# Patient Record
Sex: FEMALE | URBAN - METROPOLITAN AREA
[De-identification: names, ages, dates, MRNs, and addresses within clinical notes are randomized per-mention and may not be internally consistent; named-entity substitution may affect disease eponyms.]

---

## 2021-05-10 ENCOUNTER — TESTING ONLY (OUTPATIENT)
Dept: URBAN - METROPOLITAN AREA CLINIC 33 | Facility: CLINIC | Age: 26
End: 2021-05-10

## 2021-05-10 DIAGNOSIS — H52.223 REGULAR ASTIGMATISM, BILATERAL: Primary | ICD-10-CM

## 2021-05-10 ASSESSMENT — VISUAL ACUITY
OD: 20/25-2
OS: 20/20

## 2021-05-10 ASSESSMENT — INTRAOCULAR PRESSURE
OD: 13
OS: 13

## 2021-05-21 ENCOUNTER — SURGERY (OUTPATIENT)
Dept: URBAN - METROPOLITAN AREA SURGERY 16 | Facility: SURGERY | Age: 26
End: 2021-05-21

## 2021-05-21 ENCOUNTER — POST-OPERATIVE VISIT (OUTPATIENT)
Dept: URBAN - METROPOLITAN AREA CLINIC 33 | Facility: CLINIC | Age: 26
End: 2021-05-21

## 2021-05-21 PROCEDURE — LASIK LASIK SURGEON'S FEE: CUSTOM | Performed by: OPHTHALMOLOGY

## 2021-05-21 PROCEDURE — 99024 POSTOP FOLLOW-UP VISIT: CPT | Performed by: OPHTHALMOLOGY

## 2021-05-21 PROCEDURE — LASIK LASIK PRKSURGEON'S FEE: CUSTOM | Performed by: OPHTHALMOLOGY

## 2021-05-21 NOTE — IMPRESSION/PLAN
Impression:  Encounter for surgical aftercare following surgery on a sense organ  Z48.810.  Excellent post op course   Post operative instructions reviewed - Condition is improving - Plan: Ofloxacin and Durezol QID OU, PF ATS Q30 minutes

## 2021-06-01 ENCOUNTER — POST-OPERATIVE VISIT (OUTPATIENT)
Dept: URBAN - METROPOLITAN AREA CLINIC 11 | Facility: CLINIC | Age: 26
End: 2021-06-01

## 2021-06-01 DIAGNOSIS — Z48.810 ENCOUNTER FOR SURGICAL AFTERCARE FOLLOWING SURGERY ON A SENSE ORGAN: Primary | ICD-10-CM

## 2021-06-01 PROCEDURE — 99024 POSTOP FOLLOW-UP VISIT: CPT | Performed by: OPTOMETRIST

## 2021-06-01 NOTE — IMPRESSION/PLAN
Impression: S/P LASIK OU - 11 Days. Encounter for surgical aftercare following surgery on a sense organ  Z48.810. Plan: drops f/u [facility] in 1wk d/c oflox Durezol 1gt 6x/day OD, bid OS
art tears 1gt q 30 min - 1hr

## 2021-06-07 ENCOUNTER — POST-OPERATIVE VISIT (OUTPATIENT)
Dept: URBAN - METROPOLITAN AREA CLINIC 32 | Facility: CLINIC | Age: 26
End: 2021-06-07

## 2021-06-07 PROCEDURE — 99024 POSTOP FOLLOW-UP VISIT: CPT | Performed by: OPHTHALMOLOGY

## 2021-06-07 ASSESSMENT — INTRAOCULAR PRESSURE
OS: 20
OD: 20

## 2021-06-07 NOTE — IMPRESSION/PLAN
Impression: S/P LASIK OU - 17 Days. Encounter for surgical aftercare following surgery on a sense organ  Z48.810. Post operative instructions reviewed - Condition is improving - Plan: Decrease durezol to TID OD and D/C OS.  Continue ATS OU

## 2021-08-02 ENCOUNTER — POST-OPERATIVE VISIT (OUTPATIENT)
Dept: URBAN - METROPOLITAN AREA CLINIC 32 | Facility: CLINIC | Age: 26
End: 2021-08-02

## 2021-08-02 PROCEDURE — 99024 POSTOP FOLLOW-UP VISIT: CPT | Performed by: OPHTHALMOLOGY

## 2021-08-02 ASSESSMENT — INTRAOCULAR PRESSURE
OD: 8
OS: 10

## 2021-08-02 NOTE — IMPRESSION/PLAN
Impression: S/P LASIK OU - 73 Days. Encounter for surgical aftercare following surgery on a sense organ  Z48.810. Post operative instructions reviewed - Condition is improving - Plan: Discussed post operative visual acuity. Discussed astigmatism. Discussed treatment options. Recommend cornea consult with Dr Charo Dias for possible cross linking. --Advised patient to use artificial tears for comfort.

## 2021-12-15 ENCOUNTER — OFFICE VISIT (OUTPATIENT)
Dept: URBAN - METROPOLITAN AREA CLINIC 10 | Facility: CLINIC | Age: 26
End: 2021-12-15
Payer: COMMERCIAL

## 2021-12-15 PROCEDURE — 92286 ANT SGM IMG I&R SPECLR MIC: CPT | Performed by: OPHTHALMOLOGY

## 2021-12-15 PROCEDURE — 99204 OFFICE O/P NEW MOD 45 MIN: CPT | Performed by: OPHTHALMOLOGY

## 2021-12-15 PROCEDURE — 92025 CPTRIZED CORNEAL TOPOGRAPHY: CPT | Performed by: OPHTHALMOLOGY

## 2021-12-15 ASSESSMENT — VISUAL ACUITY
OS: 20/20
OD: 20/40

## 2021-12-15 ASSESSMENT — INTRAOCULAR PRESSURE
OS: 14
OD: 10

## 2021-12-15 NOTE — IMPRESSION/PLAN
Impression: Corneal ectasia, right eye: H18.711.
- progressive increase in corneal cyl OD in last few months. Plan: Explained to the patient the progressive nature of the disease and can significantly reduce a patient's visual acuity and visual quality. First line treatments include hard contact lenses, scleral contact lenses and intracorneal ring segments. These treatments address signs and symptoms but do not halt progression of the disease. If the disease progresses, it can lead to corneal blindness and may require corneal transplantation for visual recovery which is invasive, has a prolonged recovery period, and carries a lifelong risk of tissue rejection, graft failure, post-surgical medications and other complications. Corneal crosslinking (CXL) is the only FDA-approved treatment modality that has been shown to slow or halt the progression of keratoconus. This may prevent the need for future corneal transplantation. Early treatment of keratoconus with CXL is medically necessary and indicated. The pt understands and would like to proceed with surgery.   Schedule for CXL OD

## 2022-02-23 ENCOUNTER — OFFICE VISIT (OUTPATIENT)
Dept: URBAN - METROPOLITAN AREA CLINIC 10 | Facility: CLINIC | Age: 27
End: 2022-02-23
Payer: COMMERCIAL

## 2022-02-23 PROCEDURE — 99214 OFFICE O/P EST MOD 30 MIN: CPT | Performed by: OPHTHALMOLOGY

## 2022-02-23 PROCEDURE — 76514 ECHO EXAM OF EYE THICKNESS: CPT | Performed by: OPHTHALMOLOGY

## 2022-02-23 PROCEDURE — 92025 CPTRIZED CORNEAL TOPOGRAPHY: CPT | Performed by: OPHTHALMOLOGY

## 2022-02-23 ASSESSMENT — INTRAOCULAR PRESSURE
OD: 14
OS: 14

## 2022-02-23 ASSESSMENT — VISUAL ACUITY
OD: 20/40
OS: 20/25

## 2022-02-23 NOTE — IMPRESSION/PLAN
Impression: Corneal ectasia, right eye: H18.711.
- progressive increase in corneal cyl OD in last few months. - pentacam shows increase in Kmax from 52.9D to 55.3D in the last 2 months; Final D worse today (7.48D) compared to 2 months ago (4.90D) - topographic cyl increased 1 D (from 4.55D to 5.80D in 2 months) -Decrease in BCVA from 20/25 to 20/40 in the last year Plan: Explained to the patient the progressive nature of the disease and can significantly reduce a patient's visual acuity and visual quality. First line treatments include hard contact lenses, scleral contact lenses and intracorneal ring segments. These treatments address signs and symptoms but do not halt progression of the disease. If the disease progresses, it can lead to corneal blindness and may require corneal transplantation for visual recovery which is invasive, has a prolonged recovery period, and carries a lifelong risk of tissue rejection, graft failure, post-surgical medications and other complications. Corneal crosslinking (CXL) is the only FDA-approved treatment modality that has been shown to slow or halt the progression of keratoconus. This may prevent the need for future corneal transplantation. Early treatment of keratoconus with CXL is medically necessary and indicated. The pt understands and would like to proceed with surgery.   Proceed as scheduled with CXL OD

## 2022-03-23 ENCOUNTER — PROCEDURE (OUTPATIENT)
Dept: URBAN - METROPOLITAN AREA CLINIC 10 | Facility: CLINIC | Age: 27
End: 2022-03-23
Payer: COMMERCIAL

## 2022-03-23 DIAGNOSIS — H18.711 CORNEAL ECTASIA, RIGHT EYE: Primary | ICD-10-CM

## 2022-03-23 PROCEDURE — 0402T COLGN CRS-LINK CRN&PACHYMTRY: CPT | Performed by: OPHTHALMOLOGY

## 2022-03-29 ENCOUNTER — OFFICE VISIT (OUTPATIENT)
Dept: URBAN - METROPOLITAN AREA CLINIC 43 | Facility: CLINIC | Age: 27
End: 2022-03-29

## 2022-03-29 PROCEDURE — 99203 OFFICE O/P NEW LOW 30 MIN: CPT | Performed by: OPTOMETRIST

## 2022-03-29 NOTE — IMPRESSION/PLAN
Impression: Corneal ectasia, right eye: H18.841. Plan: s/p CXL OD, BCL removed, mild epithelial haze/PEK, d/c oflox tomorrow, taper pred TIDx 1 week, BID x 1 week, QD x 1 week then d/c.   Cont PF ATs, keep next post op with Dr. Emeterio Jimenez

## 2022-04-28 ENCOUNTER — OFFICE VISIT (OUTPATIENT)
Dept: URBAN - METROPOLITAN AREA CLINIC 10 | Facility: CLINIC | Age: 27
End: 2022-04-28
Payer: COMMERCIAL

## 2022-04-28 DIAGNOSIS — H18.711 CORNEAL ECTASIA, RIGHT EYE: Primary | ICD-10-CM

## 2022-04-28 PROCEDURE — 99213 OFFICE O/P EST LOW 20 MIN: CPT | Performed by: OPHTHALMOLOGY

## 2022-04-28 ASSESSMENT — INTRAOCULAR PRESSURE: OD: 15

## 2022-04-28 NOTE — IMPRESSION/PLAN
Impression: Corneal ectasia, right eye: H18.711.
- s/p CXL OD 3/23/22 Plan: POM#1, mild striae, restart pred TID, weekly taper Cont lubrication. Precautions reviewed. Will recheck cornea OD nv, and determine if there is any progression OS
RTC  Phx office for refract, IOP check, pentacam, pachy.

## 2022-06-14 ENCOUNTER — OFFICE VISIT (OUTPATIENT)
Dept: URBAN - METROPOLITAN AREA CLINIC 10 | Facility: CLINIC | Age: 27
End: 2022-06-14
Payer: COMMERCIAL

## 2022-06-14 DIAGNOSIS — H18.711 CORNEAL ECTASIA, RIGHT EYE: Primary | ICD-10-CM

## 2022-06-14 DIAGNOSIS — H18.712 CORNEAL ECTASIA, LEFT EYE: ICD-10-CM

## 2022-06-14 PROCEDURE — 92025 CPTRIZED CORNEAL TOPOGRAPHY: CPT | Performed by: OPHTHALMOLOGY

## 2022-06-14 PROCEDURE — 99214 OFFICE O/P EST MOD 30 MIN: CPT | Performed by: OPHTHALMOLOGY

## 2022-06-14 PROCEDURE — 76514 ECHO EXAM OF EYE THICKNESS: CPT | Performed by: OPHTHALMOLOGY

## 2022-06-14 ASSESSMENT — INTRAOCULAR PRESSURE
OS: 12
OD: 12

## 2022-06-14 ASSESSMENT — VISUAL ACUITY
OS: 20/25
OD: 20/30

## 2022-06-14 NOTE — IMPRESSION/PLAN
Impression: Corneal ectasia, right eye: H18.711.
- s/p CXL OD 3/23/22 Plan: POM#3, Pentacam stable OD. Will CTM for now Cont lubrication. Precautions reviewed.

## 2022-06-14 NOTE — IMPRESSION/PLAN
Impression: Corneal ectasia, left eye: H18.222. Plan: Explained to the patient the progressive nature of the disease and can significantly reduce a patient's visual acuity and visual quality. First line treatments include hard contact lenses, scleral contact lenses and intracorneal ring segments. These treatments address signs and symptoms but do not halt progression of the disease. If the disease progresses, it can lead to corneal blindness and may require corneal transplantation for visual recovery which is invasive, has a prolonged recovery period, and carries a lifelong risk of tissue rejection, graft failure, post-surgical medications and other complications. Corneal crosslinking (CXL) is the only FDA-approved treatment modality that has been shown to slow or halt the progression of keratoconus. This may prevent the need for future corneal transplantation. Early treatment of keratoconus with CXL is medically necessary and indicated. The pt understands and would like to proceed with surgery. **Steepest Ks have increased >1D OS: has increased from 41.3D to 42.4D OS, Va dropped from 20/25 to 20/40 OS. 
Schedule for CXL OS

## 2023-09-05 ENCOUNTER — OFFICE VISIT (OUTPATIENT)
Dept: URBAN - METROPOLITAN AREA CLINIC 10 | Facility: CLINIC | Age: 28
End: 2023-09-05
Payer: COMMERCIAL

## 2023-09-05 DIAGNOSIS — H18.712 CORNEAL ECTASIA, LEFT EYE: ICD-10-CM

## 2023-09-05 DIAGNOSIS — H18.711 CORNEAL ECTASIA, RIGHT EYE: Primary | ICD-10-CM

## 2023-09-05 PROCEDURE — 99213 OFFICE O/P EST LOW 20 MIN: CPT | Performed by: OPHTHALMOLOGY

## 2023-09-05 PROCEDURE — 92025 CPTRIZED CORNEAL TOPOGRAPHY: CPT | Performed by: OPHTHALMOLOGY

## 2023-09-05 PROCEDURE — 76514 ECHO EXAM OF EYE THICKNESS: CPT | Performed by: OPHTHALMOLOGY

## 2023-09-05 ASSESSMENT — KERATOMETRY
OS: 43.13
OD: 45.13

## 2023-09-05 ASSESSMENT — INTRAOCULAR PRESSURE
OS: 9
OD: 12

## 2023-09-05 ASSESSMENT — VISUAL ACUITY
OD: 20/70
OS: 20/30

## 2023-10-04 ENCOUNTER — OFFICE VISIT (OUTPATIENT)
Dept: URBAN - METROPOLITAN AREA CLINIC 10 | Facility: CLINIC | Age: 28
End: 2023-10-04
Payer: COMMERCIAL

## 2023-10-04 DIAGNOSIS — H18.712 CORNEAL ECTASIA, LEFT EYE: Primary | ICD-10-CM

## 2023-10-04 PROCEDURE — 0402T COLGN CRS-LINK CRN&PACHYMTRY: CPT | Performed by: OPHTHALMOLOGY

## 2023-10-09 ENCOUNTER — OFFICE VISIT (OUTPATIENT)
Dept: URBAN - METROPOLITAN AREA CLINIC 10 | Facility: CLINIC | Age: 28
End: 2023-10-09
Payer: COMMERCIAL

## 2023-10-09 DIAGNOSIS — H18.711 CORNEAL ECTASIA, RIGHT EYE: Primary | ICD-10-CM

## 2023-10-09 PROCEDURE — 99213 OFFICE O/P EST LOW 20 MIN: CPT | Performed by: OPTOMETRIST

## 2023-11-09 ENCOUNTER — OFFICE VISIT (OUTPATIENT)
Dept: URBAN - METROPOLITAN AREA CLINIC 10 | Facility: CLINIC | Age: 28
End: 2023-11-09
Payer: COMMERCIAL

## 2023-11-09 DIAGNOSIS — H18.711 CORNEAL ECTASIA, RIGHT EYE: Primary | ICD-10-CM

## 2023-11-09 PROCEDURE — 99213 OFFICE O/P EST LOW 20 MIN: CPT | Performed by: OPHTHALMOLOGY
